# Patient Record
Sex: MALE | Race: WHITE | NOT HISPANIC OR LATINO | Employment: FULL TIME | URBAN - METROPOLITAN AREA
[De-identification: names, ages, dates, MRNs, and addresses within clinical notes are randomized per-mention and may not be internally consistent; named-entity substitution may affect disease eponyms.]

---

## 2022-11-25 ASSESSMENT — ENCOUNTER SYMPTOMS
HEARTBURN: 0
FREQUENCY: 0
FEVER: 0
JOINT SWELLING: 0
NERVOUS/ANXIOUS: 0
WEAKNESS: 0
DIARRHEA: 0
HEADACHES: 0
NAUSEA: 0
SORE THROAT: 0
CHILLS: 0
DIZZINESS: 0
CONSTIPATION: 0
PARESTHESIAS: 0
ARTHRALGIAS: 0
DYSURIA: 0
MYALGIAS: 0
EYE PAIN: 0
ABDOMINAL PAIN: 0
COUGH: 0
HEMATOCHEZIA: 0
HEMATURIA: 0
SHORTNESS OF BREATH: 0
PALPITATIONS: 0

## 2022-12-02 ENCOUNTER — LAB (OUTPATIENT)
Dept: LAB | Facility: CLINIC | Age: 37
End: 2022-12-02
Payer: COMMERCIAL

## 2022-12-02 ENCOUNTER — OFFICE VISIT (OUTPATIENT)
Dept: FAMILY MEDICINE | Facility: CLINIC | Age: 37
End: 2022-12-02
Payer: COMMERCIAL

## 2022-12-02 VITALS
OXYGEN SATURATION: 99 % | HEIGHT: 75 IN | RESPIRATION RATE: 11 BRPM | SYSTOLIC BLOOD PRESSURE: 137 MMHG | DIASTOLIC BLOOD PRESSURE: 89 MMHG | TEMPERATURE: 97.5 F | BODY MASS INDEX: 21.82 KG/M2 | WEIGHT: 175.5 LBS | HEART RATE: 76 BPM

## 2022-12-02 DIAGNOSIS — Z13.220 SCREENING FOR HYPERLIPIDEMIA: ICD-10-CM

## 2022-12-02 DIAGNOSIS — Z00.00 ENCOUNTER FOR ANNUAL PHYSICAL EXAM: Primary | ICD-10-CM

## 2022-12-02 DIAGNOSIS — Z13.1 SCREENING FOR DIABETES MELLITUS: ICD-10-CM

## 2022-12-02 DIAGNOSIS — Z23 ENCOUNTER FOR IMMUNIZATION: ICD-10-CM

## 2022-12-02 DIAGNOSIS — I78.1 NEVUS, NON-NEOPLASTIC: ICD-10-CM

## 2022-12-02 LAB
CHOLEST SERPL-MCNC: 204 MG/DL
FASTING STATUS PATIENT QL REPORTED: YES
GLUCOSE SERPL-MCNC: 84 MG/DL (ref 70–99)
HDLC SERPL-MCNC: 79 MG/DL
LDLC SERPL CALC-MCNC: 116 MG/DL
NONHDLC SERPL-MCNC: 125 MG/DL
TRIGL SERPL-MCNC: 45 MG/DL

## 2022-12-02 PROCEDURE — 80061 LIPID PANEL: CPT

## 2022-12-02 PROCEDURE — 90686 IIV4 VACC NO PRSV 0.5 ML IM: CPT

## 2022-12-02 PROCEDURE — 36415 COLL VENOUS BLD VENIPUNCTURE: CPT

## 2022-12-02 PROCEDURE — 0134A COVID-19 VACCINE BIVALENT BOOSTER 18+ (MODERNA): CPT

## 2022-12-02 PROCEDURE — 90471 IMMUNIZATION ADMIN: CPT

## 2022-12-02 PROCEDURE — 82947 ASSAY GLUCOSE BLOOD QUANT: CPT

## 2022-12-02 PROCEDURE — 91313 COVID-19 VACCINE BIVALENT BOOSTER 18+ (MODERNA): CPT

## 2022-12-02 PROCEDURE — 99385 PREV VISIT NEW AGE 18-39: CPT | Mod: 25

## 2022-12-02 ASSESSMENT — ENCOUNTER SYMPTOMS
CONSTIPATION: 0
HEADACHES: 0
HEMATOCHEZIA: 0
NAUSEA: 0
DIARRHEA: 0
DYSURIA: 0
DIZZINESS: 0
SORE THROAT: 0
WEAKNESS: 0
NERVOUS/ANXIOUS: 0
COUGH: 0
PARESTHESIAS: 0
CHILLS: 0
ARTHRALGIAS: 0
EYE PAIN: 0
ABDOMINAL PAIN: 0
MYALGIAS: 0
SHORTNESS OF BREATH: 0
HEARTBURN: 0
HEMATURIA: 0
FREQUENCY: 0
FEVER: 0
PALPITATIONS: 0
JOINT SWELLING: 0

## 2022-12-02 ASSESSMENT — PAIN SCALES - GENERAL: PAINLEVEL: NO PAIN (0)

## 2022-12-02 NOTE — PROGRESS NOTES
SUBJECTIVE:   CC: Sesar is an 37 year old who presents for preventative health visit.   Patient has been advised of split billing requirements and indicates understanding: Yes  Healthy Habits:     Getting at least 3 servings of Calcium per day:  Yes    Bi-annual eye exam:  NO    Dental care twice a year:  Yes    Sleep apnea or symptoms of sleep apnea:  None    Diet:  Regular (no restrictions)    Frequency of exercise:  2-3 days/week    Duration of exercise:  45-60 minutes    Taking medications regularly:  Yes    Medication side effects:  Not applicable    PHQ-2 Total Score: 0    Additional concerns today:  No    Exercise: Volleyball in [place]. Cardio-bike, weights.  Moved her from [place] to be near family sister. Mom and dad are moving up here from [place]  Family: Wife- Yulisa ([employer]- ). 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    Lack of discipline, Pet has been tough for getting. Purchased some equipment.    Today's PHQ-2 Score:   PHQ-2 ( 1999 Pfizer) 11/25/2022   Q1: Little interest or pleasure in doing things 0   Q2: Feeling down, depressed or hopeless 0   PHQ-2 Score 0   Q1: Little interest or pleasure in doing things Not at all   Q2: Feeling down, depressed or hopeless Not at all   PHQ-2 Score 0       Have you ever done Advance Care Planning? (For example, a Health Directive, POLST, or a discussion with a medical provider or your loved ones about your wishes): No, advance care planning information given to patient to review.  Patient plans to discuss their wishes with loved ones or provider.      Social History     Tobacco Use     Smoking status: Never     Smokeless tobacco: Never   Substance Use Topics     Alcohol use: Not on file     If you drink alcohol do you typically have >3 drinks per day or >7 drinks per week? No    Alcohol Use 11/25/2022   Prescreen: >3 drinks/day or >7  "drinks/week? No   No flowsheet data found.    Last PSA: No results found for: PSA    Reviewed orders with patient. Reviewed health maintenance and updated orders accordingly - Yes  Lab work is in process    Reviewed and updated as needed this visit by clinical staff   Tobacco   Meds              Reviewed and updated as needed this visit by Provider                   Review of Systems   Constitutional: Negative for chills and fever.   HENT: Negative for congestion, ear pain, hearing loss and sore throat.    Eyes: Negative for pain and visual disturbance.   Respiratory: Negative for cough and shortness of breath.    Cardiovascular: Negative for chest pain, palpitations and peripheral edema.   Gastrointestinal: Negative for abdominal pain, constipation, diarrhea, heartburn, hematochezia and nausea.   Genitourinary: Negative for dysuria, frequency, genital sores, hematuria, impotence, penile discharge and urgency.   Musculoskeletal: Negative for arthralgias, joint swelling and myalgias.   Skin: Negative for rash.   Neurological: Negative for dizziness, weakness, headaches and paresthesias.   Psychiatric/Behavioral: Negative for mood changes. The patient is not nervous/anxious.        OBJECTIVE:   /89 (BP Location: Right arm, Patient Position: Sitting, Cuff Size: Adult Regular)   Pulse 76   Temp 97.5  F (36.4  C) (Temporal)   Resp 11   Ht 1.91 m (6' 3.2\")   Wt 79.6 kg (175 lb 8 oz)   SpO2 99%   BMI 21.82 kg/m      Physical Exam  GENERAL: healthy, alert and no distress  EYES: Eyes grossly normal to inspection, PERRL and conjunctivae and sclerae normal  HENT: ear canals and TM's normal, nose and mouth without ulcers or lesions  NECK: no adenopathy, no asymmetry, masses, or scars and thyroid normal to palpation  RESP: lungs clear to auscultation - no rales, rhonchi or wheezes  CV: regular rate and rhythm, normal S1 S2, no S3 or S4, no murmur, click or rub, no peripheral edema and peripheral pulses " strong  ABDOMEN: soft, nontender, no hepatosplenomegaly, no masses and bowel sounds normal  MS: no gross musculoskeletal defects noted, no edema  SKIN: Multiple nevi across back, with a ~3-4 mm darkened macular nevus on upper back, uniform in color and regular oval shape.  NEURO: Normal strength and tone, mentation intact and speech normal  PSYCH: mentation appears normal, affect normal/bright      ASSESSMENT/PLAN:   (Z00.00) Encounter for annual physical exam  (primary encounter diagnosis)  Plan: Adult Eye  Referral    (Z13.220) Screening for hyperlipidemia  Plan: Lipid panel reflex to direct LDL Fasting    (I78.1) Nevus, non-neoplastic  Plan: Adult Dermatology Referral  Patient is interested in getting a skin check with dermatology for the moles on his back.  His moles currently appear benign at this time.    (Z23) Encounter for immunization  Plan: INFLUENZA VACCINE IM > 6 MONTHS VALENT IIV4         (AFLURIA/FLUZONE), COVID-19 VACCINE BIVALENT         BOOSTER 18+ (MODERNA)    (Z13.1) Screening for diabetes mellitus  Plan: Glucose    Patient has been advised of split billing requirements and indicates understanding: Yes      COUNSELING:   Reviewed preventive health counseling, as reflected in patient instructions       Regular exercise       Healthy diet/nutrition      He reports that he has never smoked. He has never used smokeless tobacco.    Ortega Jeffrey NP  [facility]

## 2023-01-05 ENCOUNTER — TELEPHONE (OUTPATIENT)
Dept: FAMILY MEDICINE | Facility: CLINIC | Age: 38
End: 2023-01-05

## 2023-01-10 NOTE — TELEPHONE ENCOUNTER
Patient called back and rescheduled for in-person on 1/17/23.     Trista Weinstein RN  [facility]

## 2023-01-10 NOTE — TELEPHONE ENCOUNTER
Attempted to call patient and also sent message via [employer].     Trista Weinstein RN  [facility]

## 2023-01-10 NOTE — TELEPHONE ENCOUNTER
Please call patient and see if he is able to come to the clinic for an in-person visit on 1/16 when he is scheduled.  It is a consult for a vasectomy and that should be in person.    Thanks,  Chapincito Real MD

## 2023-01-13 ENCOUNTER — OFFICE VISIT (OUTPATIENT)
Dept: OPHTHALMOLOGY | Facility: CLINIC | Age: 38
End: 2023-01-13
Attending: STUDENT IN AN ORGANIZED HEALTH CARE EDUCATION/TRAINING PROGRAM
Payer: COMMERCIAL

## 2023-01-13 DIAGNOSIS — H02.883 MEIBOMIAN GLAND DYSFUNCTION (MGD) OF BOTH EYES: ICD-10-CM

## 2023-01-13 DIAGNOSIS — H02.886 MEIBOMIAN GLAND DYSFUNCTION (MGD) OF BOTH EYES: ICD-10-CM

## 2023-01-13 PROCEDURE — G0463 HOSPITAL OUTPT CLINIC VISIT: HCPCS | Mod: 25

## 2023-01-13 PROCEDURE — 92004 COMPRE OPH EXAM NEW PT 1/>: CPT | Performed by: STUDENT IN AN ORGANIZED HEALTH CARE EDUCATION/TRAINING PROGRAM

## 2023-01-13 ASSESSMENT — VISUAL ACUITY
METHOD: SNELLEN - LINEAR
METHOD_MR: PATIENT DECLINED
OS_SC: 20/20
OD_SC: 20/20

## 2023-01-13 ASSESSMENT — CONF VISUAL FIELD
METHOD: COUNTING FINGERS
OS_NORMAL: 1
OS_SUPERIOR_NASAL_RESTRICTION: 0
OD_NORMAL: 1
OD_SUPERIOR_NASAL_RESTRICTION: 0
OS_SUPERIOR_TEMPORAL_RESTRICTION: 0
OS_INFERIOR_NASAL_RESTRICTION: 0
OD_INFERIOR_TEMPORAL_RESTRICTION: 0
OD_SUPERIOR_TEMPORAL_RESTRICTION: 0
OS_INFERIOR_TEMPORAL_RESTRICTION: 0
OD_INFERIOR_NASAL_RESTRICTION: 0

## 2023-01-13 ASSESSMENT — TONOMETRY
IOP_METHOD: TONOPEN
OS_IOP_MMHG: 19
OD_IOP_MMHG: 20

## 2023-01-13 ASSESSMENT — CUP TO DISC RATIO
OD_RATIO: 0.25
OS_RATIO: 0.25

## 2023-01-13 ASSESSMENT — EXTERNAL EXAM - RIGHT EYE: OD_EXAM: WNL

## 2023-01-13 ASSESSMENT — EXTERNAL EXAM - LEFT EYE: OS_EXAM: WNL

## 2023-01-13 ASSESSMENT — SLIT LAMP EXAM - LIDS
COMMENTS: MGD, COLLARETTES
COMMENTS: MGD, COLLARETTES

## 2023-01-13 NOTE — PROGRESS NOTES
HPI    Patient states that his vision is well in both eyes at distance and near. NO pain and irritation. No flashes of light. No floaters. First eye exam. Was recommended by primary doctor.     Ocular Meds:none     Rex Leyva COT, January 13, 2023 8:53 AM        Last edited by Rex Leyva on 1/13/2023  9:05 AM.          Review of systems for the eyes was negative other than the pertinent positives/negatives listed in the HPI.    Ocular Meds: none    Ocular Hx: none    FOHx: no family history of glaucoma or blindness    PMHx: No diabetes    Assessment & Plan      Adriano Lou is a 37 year old male with the following diagnoses:    1. Meibomian gland dysfunction (MGD) of both eyes       Here for routine eye exam. No new visual or ocular issues.    Meibomian Gland Dysfunction OU  - preservative free artificial tears QID and PRN OU  - warm compresses at least BID OU for 5-10 min each time  - eyelid scrubs BID OU with baby shampoo or using lid scrub products such as OCuSOFT    Counseled return/RD precautions    Patient disposition:   Return in about 1 year (around 1/13/2024) for Annual Visit, or sooner changes.       Attending Physician Attestation:  Complete documentation of historical and exam elements from today's encounter can be found in the full encounter summary report (not reduplicated in this progress note).  I personally obtained the chief complaint(s) and history of present illness.  I confirmed and edited as necessary the review of systems, past medical/surgical history, family history, social history, and examination findings as documented by others; and I examined the patient myself.  I personally reviewed the relevant tests, images, and reports as documented above.  I formulated and edited as necessary the assessment and plan and discussed the findings and management plan with the patient and family. . - Lisette Cool MD

## 2023-01-13 NOTE — NURSING NOTE
Chief Complaints and History of Present Illnesses   Patient presents with     COMPREHENSIVE EYE EXAM     Chief Complaint(s) and History of Present Illness(es)     COMPREHENSIVE EYE EXAM           Comments    Patient states that his vision is well in both eyes at distance and near. NO pain and irritation. No flashes of light. No floaters. First eye exam. Was recommended by primary doctor.     Ocular Meds:none     Rex Leyva COT, January 13, 2023 8:53 AM

## 2023-01-17 ENCOUNTER — OFFICE VISIT (OUTPATIENT)
Dept: FAMILY MEDICINE | Facility: CLINIC | Age: 38
End: 2023-01-17
Payer: COMMERCIAL

## 2023-01-17 VITALS
RESPIRATION RATE: 10 BRPM | SYSTOLIC BLOOD PRESSURE: 133 MMHG | OXYGEN SATURATION: 100 % | BODY MASS INDEX: 21.94 KG/M2 | DIASTOLIC BLOOD PRESSURE: 88 MMHG | WEIGHT: 171 LBS | TEMPERATURE: 97.8 F | HEART RATE: 60 BPM | HEIGHT: 74 IN

## 2023-01-17 DIAGNOSIS — Z30.09 ENCOUNTER FOR VASECTOMY COUNSELING: Primary | ICD-10-CM

## 2023-01-17 PROCEDURE — 99213 OFFICE O/P EST LOW 20 MIN: CPT | Performed by: FAMILY MEDICINE

## 2023-01-17 RX ORDER — DIAZEPAM 10 MG
10 TABLET ORAL ONCE
Qty: 1 TABLET | Refills: 0 | Status: SHIPPED | OUTPATIENT
Start: 2023-01-17 | End: 2023-01-17

## 2023-01-17 ASSESSMENT — PAIN SCALES - GENERAL: PAINLEVEL: NO PAIN (0)

## 2023-01-17 NOTE — PROGRESS NOTES
"  Assessment & Plan     Encounter for vasectomy counseling  Vasectomy information was reviewed with the patient.  Optional prescription was issued for Valium.  He can reach out to the clinic and the  can help him schedule the procedure when he would like to proceed.  - diazepam (VALIUM) 10 MG tablet; Take 1 tablet (10 mg) by mouth once for 1 dose 30-60 minutes before the procedure, must have                  No follow-ups on file.    Chapincito Real MD  [facility] TACO Kaba is a 37 year old presenting for the following health issues:  Vasectomy Consult      History of Present Illness       Reason for visit:  Vasectomy Consult    He eats 2-3 servings of fruits and vegetables daily.He consumes 0 sweetened beverage(s) daily.He exercises with enough effort to increase his heart rate 30 to 60 minutes per day.  He exercises with enough effort to increase his heart rate 5 days per week.   He is taking medications regularly.         Patient is here to consult regarding a vasectomy.  He and his wife do not have any children and are confident that they do not want any children.  He has no history of genitourinary surgery, does not take any chronic medications for any chronic health issues, and there is no family history of prostate cancer.    Review of Systems   Constitutional, HEENT, cardiovascular, pulmonary, gi and gu systems are negative, except as otherwise noted.      Objective    /88 (BP Location: Left arm, Patient Position: Sitting, Cuff Size: Adult Regular)   Pulse 60   Temp 97.8  F (36.6  C) (Temporal)   Resp 10   Ht 1.888 m (6' 2.33\")   Wt 77.6 kg (171 lb)   SpO2 100%   BMI 21.76 kg/m    Body mass index is 21.76 kg/m .  Physical Exam   GENERAL: healthy, alert and no distress  EYES: Eyes grossly normal to inspection, PERRL and conjunctivae and sclerae normal  NECK: no adenopathy, no asymmetry, masses, or scars and thyroid normal to " palpation  RESP: lungs clear to auscultation - no rales, rhonchi or wheezes  CV: regular rate and rhythm, normal S1 S2, no S3 or S4, no murmur, click or rub, no peripheral edema and peripheral pulses strong   (male): normal male genitalia without lesions or urethral discharge, no hernia   (male): testicles normal without atrophy or masses, no hernias, penis normal without urethral discharge and both vas deferens were isolated and brought to the midline with minimal difficulty.  MS: no gross musculoskeletal defects noted, no edema  SKIN: no suspicious lesions or rashes  NEURO: Normal strength and tone, mentation intact and speech normal  PSYCH: mentation appears normal, affect normal/bright

## 2023-02-10 ENCOUNTER — OFFICE VISIT (OUTPATIENT)
Dept: FAMILY MEDICINE | Facility: CLINIC | Age: 38
End: 2023-02-10
Payer: COMMERCIAL

## 2023-02-10 ENCOUNTER — APPOINTMENT (OUTPATIENT)
Dept: FAMILY MEDICINE | Facility: CLINIC | Age: 38
End: 2023-02-10
Payer: COMMERCIAL

## 2023-02-10 VITALS
WEIGHT: 172.5 LBS | HEIGHT: 74 IN | BODY MASS INDEX: 22.14 KG/M2 | SYSTOLIC BLOOD PRESSURE: 138 MMHG | DIASTOLIC BLOOD PRESSURE: 85 MMHG | TEMPERATURE: 97.8 F | RESPIRATION RATE: 10 BRPM | HEART RATE: 63 BPM | OXYGEN SATURATION: 100 %

## 2023-02-10 DIAGNOSIS — Z30.2 ENCOUNTER FOR VASECTOMY: Primary | ICD-10-CM

## 2023-02-10 PROCEDURE — 55250 REMOVAL OF SPERM DUCT(S): CPT | Performed by: FAMILY MEDICINE

## 2023-02-10 PROCEDURE — 88302 TISSUE EXAM BY PATHOLOGIST: CPT | Performed by: PATHOLOGY

## 2023-02-10 ASSESSMENT — PAIN SCALES - GENERAL: PAINLEVEL: NO PAIN (0)

## 2023-02-10 NOTE — PROGRESS NOTES
"  Assessment & Plan     Encounter for vasectomy  After cares are reviewed with the patient.  Information was given regarding aftercare's.  He was also given a sample container for specimen collection.  - VASECTOMY UNILAT/BILAT W POSTOP SEMEN  - Semen Analysis Post Vasectomy; Future  - Surgical Pathology Exam                 Return in about 1 week (around 2/17/2023) for suture removal at home.    Chapincito Real MD  [facility] TACO Kaba is a 37 year old, presenting for the following health issues:  Vasectomy      HPI       Patient presents today for elective sterilization by vasectomy.  He is accompanied by his wife today.  He has no questions and wishes to proceed with the procedure.    Review of Systems         Objective    /85 (BP Location: Right arm, Patient Position: Sitting, Cuff Size: Adult Regular)   Pulse 63   Temp 97.8  F (36.6  C) (Temporal)   Resp 10   Ht 1.885 m (6' 2.21\")   Wt 78.2 kg (172 lb 8 oz)   SpO2 100%   BMI 22.02 kg/m    Body mass index is 22.02 kg/m .  Physical Exam           Procedure note:    After informed consent was obtained we went to the procedure room.  Patient was placed in the supine position on the operating table.  The penis was retracted away from the scrotum and secured using paper tape.  The scrotum, inner thighs, and perineum were then cleaned with a Hibiclens based surgical scrub.  Sterile drape was placed over the scrotum to complete the operating field.  Attention was turned first to the left vasa which was isolated with minimal difficulty and brought to the midline.  1% lidocaine was used for anesthesia.  The vas was then grasped with the vasectomy clamp and secured adjacent to the skin.  No scalpel technique was used to enter the skin after making a 2 mm stab wound with a scalpel.  Blunt dissection and cautery were used to isolate the vas deferens.  Once the vas deferens was isolated a 1 cm section was removed and " intraluminal cautery was used to close both ends remaining in the patient.  The elongated cautery tip broke while cauterizing the prostatic end.  The cautery tip fragment was removed easily and inspected and noted to be complete.  There was a dilated pampiniform vein traversing the 2 ends of the vas.  A suture was placed to secure the site and it was allowed to drop back into the scrotum.  Attention was then turned to the right vas which was isolated and brought to the previously made opening.  1% lidocaine was instilled for anesthesia and the vas deferens was grasped using the vasectomy clamp through the previously made opening.  Blunt dissection and cautery were used to isolate the vas deferens.  1 cm section was removed and both ends remaining in the patient were closed with intraluminal cautery.  The site was closely inspected and no bleeding was noted so it was allowed to drop back into the scrotum.  The left site was then brought back through the opening in the skin, inspected, and no bleeding was noted so the site was allowed to drop back into the scrotum.  The skin was then closed with one 4-0 Vicryl interrupted suture.  EBL from the procedure was less than 2 cc.  No complications were noted.  Specimen to pathology left and right vas deferens.

## 2023-02-14 LAB
PATH REPORT.COMMENTS IMP SPEC: NORMAL
PATH REPORT.COMMENTS IMP SPEC: NORMAL
PATH REPORT.FINAL DX SPEC: NORMAL
PATH REPORT.GROSS SPEC: NORMAL
PATH REPORT.MICROSCOPIC SPEC OTHER STN: NORMAL
PATH REPORT.RELEVANT HX SPEC: NORMAL
PHOTO IMAGE: NORMAL

## 2023-04-11 NOTE — PROGRESS NOTES
[facility] Dermatology Note  Encounter Date: Apr 12, 2023  Office Visit     Dermatology Problem List:  # Multiple benign nevi  - 4mm left upper mid back, photos 4/12/2023  - 5mm right posterior thigh, photos 4/12/2023   # Intermittent tanning bed use for ~1 year, <100 times.    ____________________________________________    Assessment & Plan:    # Multiple benign nevi  # Solar lentigines   - Monitor nevi on L upper mid back, R posterior thigh, photos today  - Counseled on ABCDEs of melanoma and sun protection - recommend SPF 30 or higher with frequent application   - Return sooner if noticing changing or symptomatic lesions      # Cherry angiomas  # Seborrheic keratoses  - Discussed the natural history and benign nature of this lesion.   - Reassurance provided that no additional treatment is necessary.      Procedures Performed:   None    Follow-up: 1 year(s) in-person, or earlier for new or changing lesions    Staff and Scribe:     Scribe Disclosure:  I, MORGAN BARBOUR, am serving as a scribe to document services personally performed by Emily Eddy MD based on data collection and the provider's statements to me.     Larissa Muniz MD  Dermatology Resident, PGY-2    Staff Physician Comments:   I saw and evaluated the patient with the resident and I agree with the assessment and plan.  I was present for the examination.    Emily Eddy MD    Department of Dermatology  [facility]: Phone: [phone], Fax: [phone]  4/12/2023     ____________________________________________    CC: Derm Problem (Patient reports moles of concern. Patient would like a full body skin.)    HPI:  Mr. Adriano Lou is a(n) 37 year old male who presents today as a new patient for FBSE. The patient notes that he has had a few sun burns as a kid but cannot remember any blistering sun burns. The patient notes  that he has a spot on his back that his wife would like to have evaluated but otherwise has no lesions of concerns. No symptomatic lesions.     [place] in [place], tanning bed use intermittently for ~1 year.    Patient is otherwise feeling well, without additional skin concerns.    Labs Reviewed:  N/A    Physical Exam:  Vitals: There were no vitals taken for this visit.  SKIN: Total skin excluding the undergarment areas was performed. The exam included the head/face, neck, both arms, chest, back, abdomen, both legs, digits and/or nails.   -  4mm left upper mid back hyperpigmented macule with symmetric reticular pattern   - 5mm posterior right thigh hyperpigmented macule with unform reticular network but left upper pole with slightly darker pigmented noted on dermoscopy   - Multiple regular brown pigmented macules and papules are identified on the trunk and extremities.   - Scattered brown macules on sun exposed areas.  - There are waxy stuck on tan to brown papules on the trunk and extremities.   - No other lesions of concern on areas examined.                 Medications:  No current outpatient medications on file.     No current facility-administered medications for this visit.      Past Medical History:   There is no problem list on file for this patient.    History reviewed. No pertinent past medical history.     CC Ortega Jeffrey, NP  [address] [place] [address] on close of this encounter.

## 2023-04-12 ENCOUNTER — OFFICE VISIT (OUTPATIENT)
Dept: DERMATOLOGY | Facility: CLINIC | Age: 38
End: 2023-04-12
Payer: COMMERCIAL

## 2023-04-12 DIAGNOSIS — D18.01 CHERRY ANGIOMA: ICD-10-CM

## 2023-04-12 DIAGNOSIS — D22.9 MULTIPLE BENIGN NEVI: ICD-10-CM

## 2023-04-12 DIAGNOSIS — L82.1 SEBORRHEIC KERATOSIS: ICD-10-CM

## 2023-04-12 DIAGNOSIS — L81.4 LENTIGINES: Primary | ICD-10-CM
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
## 2023-04-12 PROCEDURE — 99203 OFFICE O/P NEW LOW 30 MIN: CPT | Mod: GC | Performed by: DERMATOLOGY

## 2023-04-12 ASSESSMENT — PAIN SCALES - GENERAL: PAINLEVEL: NO PAIN (0)

## 2023-04-12 NOTE — PATIENT INSTRUCTIONS
"Sun Protection    Recommend sunscreen (more brands also below):  - ISTALISHA Eryfotona Actinica Mineral Sunscreen SPF 50+ Zinc Oxide  - ISTALISHA Minear Brush SPF 50 (for re-application throughout the day)       Sunscreen   What does \"broad spectrum mean\"?  Broad spectrum sunscreens protect against both UVA and UVB radiation. UVC is filtered out by the ozone layer.     What does SPF mean?   SPF stands for  Sun Protection Factor  and represents the ability to screen only UVB (burning) rays. UVB rays are mostly blocked in all sunscreens, but only those that contain titanium dioxide, zinc oxide, mexoryl or Parsol 1789 (avobenzone) block the UVA spectrum. Even though a sunscreen is labeled  UVA/UVB Protection  that is not entirely accurate because products that only partially protect against UVA can claim to protect against both UVA and UVB.     What SPF should I chose?   Aim to get a sunscreen that is at least sun protection factor (SPF) 30. SPF 15 provides about 92-93% coverage, SPF 30 about 95-97% coverage, and SPF 45 about 98% coverage. That is to say, SPF 30 is not twice as good as SPF 15. The reason why we recommend SPF 30 is because we are usually only putting on half the necessary amount of sunscreen to achieve the advertised protection. That means that it is very possible that your SPF 30 sunscreen is only providing you with SPF 15 coverage based on how much you are applying. SPF 15 (92-93% coverage) is the absolute minimal that we recommend. Similarly, the benefit of sunscreens with SPF higher than 50 is that even if you put on less than the required amount, you are likely still getting good protection (ex: even if you apply only half the recommended amount of , it should still provide you with an SPF of 50).     How much should I apply?  If covering your whole body, you should be using 30 grams, or one ounce, which is how much is in one shot glass! That s a THICK layer! May times, you are only applying half " the recommended amount, which means that you are only getting half the SPF (for example, you may be using SPF 30 but if you're only applying half the recommended amount, you're only getting SPF 15 protection.      When do I need to wear sunscreen?  Every day, rain or shine! Even on a rainy day or a day when you are only indoors, you are still being exposed harmful UV radiation from the sun. We usually recommend physical/mineral sunscreens (active ingredient is titanium dioxide or zinc oxide) as these ingredients have been around for many years, there is no concern of them being absorbed into the bloodstream, and they are coral reef friendly! However, the best sunscreen is the one that you will use everyday.     What about my kids?  Sunscreen is not recommended for infants under the age of 6 months. Use clothing, shade and sun avoidance for small infants. For kids older than 6 months, we recommend that you should use only mineral/physical sunscreens that have zinc oxide as the active ingredient. Sun-protective clothing and hats are also important for people of all ages.     Sun protective clothing and Resources   Coolibar (www.coolibar.ConvertMedia)  Interactive Bid Games Inc (grabHalo)  Athleta (wwwishBowl)  SimpleDeal (wwwSportsCrunch)  Carve Designs (Asset Tracking Technologies) - affordable  Skinz (Open Mileskinz.com)    Long sleeve - Arlen Cool DRI UPF 50 or [place] PFG UPF 50  Hoodie - [place] PFG UPF 50  Swimshirt/Rash Guard - Sindy UPF 50 (on Amazon)  Neck - Outdoor Research Ubertubes (www.outdoorresearch.com)      Do I need tinted sunscreen?  There is more and more research showing that visible light can also lead to discoloration (such as melasma). Tinted sunscreens (which contain iron oxides) protect against visible light as an added bonus.     What brands do you recommend?  Physical/Mineral Sunscreens (in no particular order)  Elta MD UV Physical Broad-Spectrum SPF 41 Sunscreen (Tinted, $33)  Skin Ceuticals Physical Fusion UV  "Defense SPF 50 (Tinted, $34)  Unsun Mineral Tinted Face Sunscreen (Tinted, with 2 shade ranges, $29)  It Cosmetics CC+ Cream with SPF 50+ (Tinted, can also double as foundation/coverage -- great range of shades, $40)  Biossance Squalane + Zinc Sheer Mineral Sunscreen SPF 30 PA +++ (goes on white then blends in, $30)  Cerave 100% Mineral Sunscreen SPF 50 Face (good for sensitive skin, $15)  La Roche Posay Anthelios Mineral Zinc Oxide Sunscreen SPF 50 ($35)  La Roche Posay Anthelios Mineral Tinted Sunscreen for Face SPF 50 ($35)  Think Sport Sunscreen (great for sports, though has more of a white cast, $20)  Think Baby Sunscreen (for kids, $21)  Color Science Sunforgettable Total Protection Brush On Shield SPF 50 (Multiple tints, $130)    Chemical Sunscreens  Becca Rouleau Weightless Protection SPF 30 ($48)  Zeniada SPF Brightening Moisturizer ($30)  Urban Skin Complexion Protection Moisturizer SPF 30 ($20)  Total Defense + Repair Broad Spectrum SPF 34 ($68)  Clarins UV PLUS Anti-Pollution Sunscreen Multi-Protection Tint SPF 50 (Multiple tints, $45)  Neutrogena Healthy Skin Glow Sheers Tinted Moisturizer with SPF 20 (Multiple tints, $11)    The ABCDEs of Melanoma  Skin cancer can develop anywhere on the skin. Once a month, take a look at your entire body and note any changing moles or spots. Ask someone for help when checking your skin, especially for hard to see places such as your back. If you notice a mole that looks different from others, or one that changes, enlarges, itches, or bleeds, you should see a dermatologist.    Asymmetry, Border (irregularity), Color (not uniform, changes in color), Diameter (greater than 6 mm which is about the size of a pencil eraser), and Evolving (any changes in pre-existing moles). In short, look for the \"ugly duckling.\" You want all of the spots on your body to look like cousins (like they could be related). If something stands out, take a photo of it and make an appointment to " have it evaluated.     Suggested supplement:   - Heliocare - claims to maintain the skin's ability to protect itself against sun-related effects and aging.   - Not a replacement for sunscreen! Should be used in addition to sunscreen and sun protective measures such as hats, etc.  - Usually available online and at major retailers such as Plan Me Up, etc.

## 2023-04-12 NOTE — NURSING NOTE
Dermatology Rooming Note    Adriano Lou's goals for this visit include:   Chief Complaint   Patient presents with     Derm Problem     Patient reports moles of concern. Patient would like a full body skin.     Trista Wong RN

## 2023-04-12 NOTE — LETTER
4/12/2023       RE: Adriano Lou  [address]  [facility] [address]     Dear Colleague,    Thank you for referring your patient, Adriano Lou, to the [facility] [place] at [facility]. Please see a copy of my visit note below.    [facility] Dermatology Note  Encounter Date: Apr 12, 2023  Office Visit     Dermatology Problem List:  # Multiple benign nevi  - 4mm left upper mid back, photos 4/12/2023  - 5mm right posterior thigh, photos 4/12/2023   # Intermittent tanning bed use for ~1 year, <100 times.    ____________________________________________    Assessment & Plan:    # Multiple benign nevi  # Solar lentigines   - Monitor nevi on L upper mid back, R posterior thigh, photos today  - Counseled on ABCDEs of melanoma and sun protection - recommend SPF 30 or higher with frequent application   - Return sooner if noticing changing or symptomatic lesions      # Cherry angiomas  # Seborrheic keratoses  - Discussed the natural history and benign nature of this lesion.   - Reassurance provided that no additional treatment is necessary.      Procedures Performed:   None    Follow-up: 1 year(s) in-person, or earlier for new or changing lesions    Staff and Scribe:     Scribe Disclosure:  I, MORGAN BARBOUR, am serving as a scribe to document services personally performed by Emily Eddy MD based on data collection and the provider's statements to me.     Larissa Muniz MD  Dermatology Resident, PGY-2    Staff Physician Comments:   I saw and evaluated the patient with the resident and I agree with the assessment and plan.  I was present for the examination.    Emily Eddy MD    Department of Dermatology  [facility]: Phone: [phone], Fax: [phone]  4/12/2023      ____________________________________________    CC: Derm Problem (Patient reports moles of concern. Patient would like a full body skin.)    HPI:  Mr. Adriano Lou is a(n) 37 year old male who presents today as a new patient for FBSE. The patient notes that he has had a few sun burns as a kid but cannot remember any blistering sun burns. The patient notes that he has a spot on his back that his wife would like to have evaluated but otherwise has no lesions of concerns. No symptomatic lesions.     Grew up in [place], tanning bed use intermittently for ~1 year.    Patient is otherwise feeling well, without additional skin concerns.    Labs Reviewed:  N/A    Physical Exam:  Vitals: There were no vitals taken for this visit.  SKIN: Total skin excluding the undergarment areas was performed. The exam included the head/face, neck, both arms, chest, back, abdomen, both legs, digits and/or nails.   -  4mm left upper mid back hyperpigmented macule with symmetric reticular pattern   - 5mm posterior right thigh hyperpigmented macule with unform reticular network but left upper pole with slightly darker pigmented noted on dermoscopy   - Multiple regular brown pigmented macules and papules are identified on the trunk and extremities.   - Scattered brown macules on sun exposed areas.  - There are waxy stuck on tan to brown papules on the trunk and extremities.   - No other lesions of concern on areas examined.                 Medications:  No current outpatient medications on file.     No current facility-administered medications for this visit.      Past Medical History:   There is no problem list on file for this patient.    History reviewed. No pertinent past medical history.     CC Ortega Jeffrey, NP  [address] [place] [address] on close of this encounter.

## 2023-05-22 ENCOUNTER — OFFICE VISIT (OUTPATIENT)
Dept: OPHTHALMOLOGY | Facility: CLINIC | Age: 38
End: 2023-05-22
Attending: STUDENT IN AN ORGANIZED HEALTH CARE EDUCATION/TRAINING PROGRAM
Payer: COMMERCIAL

## 2023-05-22 DIAGNOSIS — H00.15 CHALAZION LEFT LOWER EYELID: ICD-10-CM

## 2023-05-22 DIAGNOSIS — H01.009 BLEPHARITIS OF BOTH UPPER AND LOWER EYELID: Primary | ICD-10-CM

## 2023-05-22 PROCEDURE — 99213 OFFICE O/P EST LOW 20 MIN: CPT | Performed by: STUDENT IN AN ORGANIZED HEALTH CARE EDUCATION/TRAINING PROGRAM

## 2023-05-22 RX ORDER — NEOMYCIN SULFATE, POLYMYXIN B SULFATE, AND DEXAMETHASONE 3.5; 10000; 1 MG/G; [USP'U]/G; MG/G
0.5 OINTMENT OPHTHALMIC AT BEDTIME
Qty: 3.5 G | Refills: 1 | Status: SHIPPED | OUTPATIENT
Start: 2023-05-22 | End: 2023-06-21

## 2023-05-22 RX ORDER — NEOMYCIN SULFATE, POLYMYXIN B SULFATE, AND DEXAMETHASONE 3.5; 10000; 1 MG/G; [USP'U]/G; MG/G
0.5 OINTMENT OPHTHALMIC 4 TIMES DAILY
Qty: 3.5 G | Refills: 1 | Status: SHIPPED | OUTPATIENT
Start: 2023-05-22 | End: 2023-05-22

## 2023-05-22 RX ORDER — NEOMYCIN SULFATE, POLYMYXIN B SULFATE, AND DEXAMETHASONE 3.5; 10000; 1 MG/G; [USP'U]/G; MG/G
0.5 OINTMENT OPHTHALMIC DAILY
Qty: 3.5 G | Refills: 1 | Status: SHIPPED | OUTPATIENT
Start: 2023-05-22 | End: 2023-05-22

## 2023-05-22 RX ORDER — NEOMYCIN SULFATE, POLYMYXIN B SULFATE, AND DEXAMETHASONE 3.5; 10000; 1 MG/G; [USP'U]/G; MG/G
0.5 OINTMENT OPHTHALMIC AT BEDTIME
Qty: 3.5 G | Refills: 1 | Status: SHIPPED | OUTPATIENT
Start: 2023-05-22 | End: 2023-05-22

## 2023-05-22 ASSESSMENT — VISUAL ACUITY
OD_SC: 20/15
METHOD: SNELLEN - LINEAR
OS_SC: 20/15

## 2023-05-22 ASSESSMENT — TONOMETRY
OD_IOP_MMHG: 16
IOP_METHOD: TONOPEN
OS_IOP_MMHG: 15

## 2023-05-22 ASSESSMENT — CONF VISUAL FIELD
OS_SUPERIOR_NASAL_RESTRICTION: 0
OD_INFERIOR_TEMPORAL_RESTRICTION: 0
OD_INFERIOR_NASAL_RESTRICTION: 0
OS_SUPERIOR_TEMPORAL_RESTRICTION: 0
OD_SUPERIOR_NASAL_RESTRICTION: 0
OS_INFERIOR_NASAL_RESTRICTION: 0
OS_NORMAL: 1
METHOD: COUNTING FINGERS
OS_INFERIOR_TEMPORAL_RESTRICTION: 0
OD_SUPERIOR_TEMPORAL_RESTRICTION: 0
OD_NORMAL: 1

## 2023-05-22 ASSESSMENT — EXTERNAL EXAM - RIGHT EYE: OD_EXAM: WNL

## 2023-05-22 ASSESSMENT — EXTERNAL EXAM - LEFT EYE: OS_EXAM: WNL

## 2023-05-22 NOTE — PROGRESS NOTES
HPI     Follow Up    In both eyes.  This started months ago.  Treatments tried include artificial tears.           Comments    Patient presents for Meibomian gland dysfunction (MGD) of both eyes.     Patient notes no changes in vision , does note an occurring stye on Lower left lid which first occurred 1 week ago, this is the 4th time this year so far that has occurred per pt. Occasional discomfort, denies pain. Is doing ocular treatments to help relieve.     Preservative AT's OU  Warm Compresses BID each eye  Eye lid scrubs BID each eye  Ocusoft     JOSELITO Waters OA May 22, 2023   Reviewed by Brittani Ramirez COT 10:28 AM May 22, 2023             Last edited by Brittani Ramirez on 5/22/2023 10:28 AM.          Review of systems for the eyes was negative other than the pertinent positives/negatives listed in the HPI.    Ocular Meds:   ATs prn OU  Warm Compresses BID OU  OcuSOFT Eye lid scrubs BID OU    Ocular Hx: mgd OU    FOHx: no family history of glaucoma or blindness    PMHx: No diabetes    Assessment & Plan      Brodyjones Lou is a 37 year old male with the following diagnoses:    1. Blepharitis of both upper and lower eyelid       - Here for follow up of mgd and new left lower eyelid chalazion; has had several stye come and go; discussed various treatment options including trial of PO doxycycline, antibiotic ophthalmic ointment, antibiotic-steroid combo ointment  - preservative free artificial tears QID and PRN OU  - warm compresses at least BID OU for 5-10 min each time  - eyelid scrubs BID OU with baby shampoo or using lid scrub products such as OCuSOFT  - start maxitrol ophthalmic ointment at bedtime OU x 30 days, can transition to erythromycin ophthalmic ointment at bedtime OU thereafter  - medication side effects reviewed    Counseled return/RD precautions    Patient disposition:   Return in about 4 weeks (around 6/19/2023) for Follow Up, VT, or sooner changes.       Attending Physician Attestation:   Complete documentation of historical and exam elements from today's encounter can be found in the full encounter summary report (not reduplicated in this progress note).  I personally obtained the chief complaint(s) and history of present illness.  I confirmed and edited as necessary the review of systems, past medical/surgical history, family history, social history, and examination findings as documented by others; and I examined the patient myself.  I personally reviewed the relevant tests, images, and reports as documented above.  I formulated and edited as necessary the assessment and plan and discussed the findings and management plan with the patient and family. . - Lisette Cool MD

## 2023-05-22 NOTE — NURSING NOTE
Chief Complaints and History of Present Illnesses   Patient presents with     Follow Up     Chief Complaint(s) and History of Present Illness(es)     Follow Up            Laterality: both eyes    Onset: months ago    Treatments tried: artificial tears          Comments    Patient presents for Meibomian gland dysfunction (MGD) of both eyes.     Patient notes no changes in vision , does note an occurring stye on Lower left lid which first occurred 1 week ago, this is the 4th time this year so far that has occurred per pt. Occasional discomfort, denies pain. Is doing ocular treatments to help relieve.     Preservative AT's OU  Warm Compresses BID each eye  Eye lid scrubs BID each eye  Regina Waters OA May 22, 2023   Reviewed by Brittani BOWER 10:28 AM May 22, 2023

## 2023-06-23 ENCOUNTER — OFFICE VISIT (OUTPATIENT)
Dept: OPHTHALMOLOGY | Facility: CLINIC | Age: 38
End: 2023-06-23
Attending: STUDENT IN AN ORGANIZED HEALTH CARE EDUCATION/TRAINING PROGRAM
Payer: COMMERCIAL

## 2023-06-23 DIAGNOSIS — H01.009 BLEPHARITIS OF BOTH UPPER AND LOWER EYELID: Primary | ICD-10-CM

## 2023-06-23 PROCEDURE — 99214 OFFICE O/P EST MOD 30 MIN: CPT | Performed by: STUDENT IN AN ORGANIZED HEALTH CARE EDUCATION/TRAINING PROGRAM

## 2023-06-23 PROCEDURE — 99213 OFFICE O/P EST LOW 20 MIN: CPT | Performed by: STUDENT IN AN ORGANIZED HEALTH CARE EDUCATION/TRAINING PROGRAM

## 2023-06-23 RX ORDER — ERYTHROMYCIN 5 MG/G
0.5 OINTMENT OPHTHALMIC AT BEDTIME
Qty: 3.5 G | Refills: 1 | Status: SHIPPED | OUTPATIENT
Start: 2023-06-23 | End: 2023-07-23

## 2023-06-23 ASSESSMENT — CONF VISUAL FIELD
OS_INFERIOR_TEMPORAL_RESTRICTION: 0
OD_INFERIOR_TEMPORAL_RESTRICTION: 0
OS_SUPERIOR_NASAL_RESTRICTION: 0
OD_INFERIOR_NASAL_RESTRICTION: 0
OS_INFERIOR_NASAL_RESTRICTION: 0
OD_SUPERIOR_NASAL_RESTRICTION: 0
METHOD: COUNTING FINGERS
OS_NORMAL: 1
OD_SUPERIOR_TEMPORAL_RESTRICTION: 0
OD_NORMAL: 1
OS_SUPERIOR_TEMPORAL_RESTRICTION: 0

## 2023-06-23 ASSESSMENT — VISUAL ACUITY
OD_SC: 20/15
OS_SC: 20/15
METHOD: SNELLEN - LINEAR

## 2023-06-23 ASSESSMENT — TONOMETRY
OS_IOP_MMHG: 14
IOP_METHOD: ICARE
OD_IOP_MMHG: 15

## 2023-06-23 ASSESSMENT — EXTERNAL EXAM - RIGHT EYE: OD_EXAM: WNL

## 2023-06-23 ASSESSMENT — EXTERNAL EXAM - LEFT EYE: OS_EXAM: WNL

## 2023-06-23 NOTE — PROGRESS NOTES
"HPI     Follow Up    In both eyes.  This started weeks ago.  Associated symptoms include redness (along LLL, some inflammation still).  Negative for eye pain and swelling.  Treatments tried include artificial tears and warm compresses.  Pain was noted as 0/10. Additional comments: 4 week follow up for Blepharitis BUL/LL, MGD each eye, and Chalazion LLL.     \"My eyes are feeling fine. The stye went away, but now there's some redness and inflammation.\" Patient states vision is good each eye. Denies changes in the last month.            Comments    Ocular meds:   - PFAT QID each eye   - Maxitrol luís at bedtime each eye   - Warm compresses BID each eye   - Lid scrubs daily each eye     CHELI Malin 8:05 AM 06/23/2023            Last edited by Rosa Murray on 6/23/2023  8:05 AM.          Review of systems for the eyes was negative other than the pertinent positives/negatives listed in the HPI.    Ocular Meds:   - PFAT QID OU   - Maxitrol ophthalmic ointment at bedtime OU  - Warm compresses BID OU   - Lid scrubs daily OU    Ocular Hx: mgd OU history of chalazion    FOHx: no family history of glaucoma or blindness    PMHx: No diabetes    Assessment & Plan      Adriano Lou is a 37 year old male with the following diagnoses:    1. Blepharitis of both upper and lower eyelid       - Here for follow up of blepharitis and left lower eyelid chalazion; has had several stye come and go; overall doing well with improvement and resolution of stye; compliant with treatment regiment  discussed various treatment options including trial of PO doxycycline, antibiotic ophthalmic ointment, antibiotic-steroid combo ointment  - continue preservative free artificial tears QID and PRN OU  - continue warm compresses at least BID OU for 5-10 min each time  - continue eyelid scrubs daily OU with baby shampoo or using lid scrub products such as OCuSOFT  - discontinue maxitrol ophthalmic ointment at bedtime OU; Rx for erythromycin " ophthalmic ointment at bedtime x 30 days OU provided to patient if any flare occurs; Rx sent but okay not to using at this time  - medication side effects reviewed    Counseled return/RD precautions    Patient disposition:   Return if symptoms worsen or fail to improve.       Attending Physician Attestation:  Complete documentation of historical and exam elements from today's encounter can be found in the full encounter summary report (not reduplicated in this progress note).  I personally obtained the chief complaint(s) and history of present illness.  I confirmed and edited as necessary the review of systems, past medical/surgical history, family history, social history, and examination findings as documented by others; and I examined the patient myself.  I personally reviewed the relevant tests, images, and reports as documented above.  I formulated and edited as necessary the assessment and plan and discussed the findings and management plan with the patient and family. . - Lisette Cool MD

## 2023-06-23 NOTE — NURSING NOTE
"Chief Complaints and History of Present Illnesses   Patient presents with     Follow Up     4 week follow up for Blepharitis BUL/LL, MGD each eye, and Chalazion LLL.     \"My eyes are feeling fine. The stye went away, but now there's some redness and inflammation.\" Patient states vision is good each eye. Denies changes in the last month.      Chief Complaint(s) and History of Present Illness(es)     Follow Up            Laterality: both eyes    Onset: weeks ago    Associated symptoms: redness (along LLL, some inflammation still).  Negative for eye pain and swelling    Treatments tried: artificial tears and warm compresses    Pain scale: 0/10    Comments: 4 week follow up for Blepharitis BUL/LL, MGD each eye, and Chalazion LLL.     \"My eyes are feeling fine. The stye went away, but now there's some redness and inflammation.\" Patient states vision is good each eye. Denies changes in the last month.           Comments    Ocular meds:   - PFAT QID each eye   - Maxitrol luís at bedtime each eye   - Warm compresses BID each eye   - Lid scrubs daily each eye     CHELI Malin 8:05 AM 06/23/2023                   "

## 2023-07-10 ENCOUNTER — LAB (OUTPATIENT)
Dept: LAB | Facility: CLINIC | Age: 38
End: 2023-07-10
Payer: COMMERCIAL

## 2023-07-10 DIAGNOSIS — Z30.2 ENCOUNTER FOR VASECTOMY: ICD-10-CM

## 2023-07-10 LAB
SEMEN ANALYSIS P VAS PNL: NORMAL
SPERM MOTILE SMN QL MICRO: NORMAL

## 2023-07-10 PROCEDURE — 89321 SEMEN ANAL SPERM DETECTION: CPT

## 2023-11-28 ASSESSMENT — ENCOUNTER SYMPTOMS
PALPITATIONS: 0
HEADACHES: 0
DIZZINESS: 0
HEARTBURN: 0
CONSTIPATION: 0
NAUSEA: 0
DYSURIA: 0
EYE PAIN: 0
WEAKNESS: 0
CHILLS: 0
HEMATOCHEZIA: 0
ABDOMINAL PAIN: 0
DIARRHEA: 0
PARESTHESIAS: 0
SHORTNESS OF BREATH: 0
HEMATURIA: 0
JOINT SWELLING: 0
COUGH: 0
FEVER: 0
ARTHRALGIAS: 0
MYALGIAS: 0
SORE THROAT: 0
FREQUENCY: 0
NERVOUS/ANXIOUS: 0

## 2023-12-05 ENCOUNTER — OFFICE VISIT (OUTPATIENT)
Dept: FAMILY MEDICINE | Facility: CLINIC | Age: 38
End: 2023-12-05
Payer: COMMERCIAL

## 2023-12-05 VITALS
TEMPERATURE: 98.4 F | SYSTOLIC BLOOD PRESSURE: 128 MMHG | BODY MASS INDEX: 22.2 KG/M2 | DIASTOLIC BLOOD PRESSURE: 72 MMHG | HEIGHT: 74 IN | RESPIRATION RATE: 14 BRPM | OXYGEN SATURATION: 98 % | HEART RATE: 69 BPM | WEIGHT: 173 LBS

## 2023-12-05 DIAGNOSIS — H61.21 IMPACTED CERUMEN OF RIGHT EAR: ICD-10-CM

## 2023-12-05 DIAGNOSIS — Z13.220 SCREENING CHOLESTEROL LEVEL: ICD-10-CM

## 2023-12-05 DIAGNOSIS — Z00.00 ENCOUNTER FOR ANNUAL PHYSICAL EXAM: Primary | ICD-10-CM

## 2023-12-05 DIAGNOSIS — Z83.49 FAMILY HISTORY OF THYROID DISEASE: ICD-10-CM

## 2023-12-05 DIAGNOSIS — Z13.1 SCREENING FOR DIABETES MELLITUS: ICD-10-CM

## 2023-12-05 LAB
CHOLEST SERPL-MCNC: 195 MG/DL
FASTING STATUS PATIENT QL REPORTED: YES
GLUCOSE SERPL-MCNC: 84 MG/DL (ref 70–99)
HDLC SERPL-MCNC: 69 MG/DL
LDLC SERPL CALC-MCNC: 114 MG/DL
NONHDLC SERPL-MCNC: 126 MG/DL
TRIGL SERPL-MCNC: 62 MG/DL
TSH SERPL DL<=0.005 MIU/L-ACNC: 4.18 UIU/ML (ref 0.3–4.2)

## 2023-12-05 PROCEDURE — 99395 PREV VISIT EST AGE 18-39: CPT

## 2023-12-05 PROCEDURE — 36415 COLL VENOUS BLD VENIPUNCTURE: CPT

## 2023-12-05 PROCEDURE — 82947 ASSAY GLUCOSE BLOOD QUANT: CPT

## 2023-12-05 PROCEDURE — 80061 LIPID PANEL: CPT

## 2023-12-05 PROCEDURE — 84443 ASSAY THYROID STIM HORMONE: CPT

## 2023-12-05 ASSESSMENT — ENCOUNTER SYMPTOMS
JOINT SWELLING: 0
PARESTHESIAS: 0
HEARTBURN: 0
SORE THROAT: 0
HEMATOCHEZIA: 0
DYSURIA: 0
DIARRHEA: 0
MYALGIAS: 0
PALPITATIONS: 0
COUGH: 0
FREQUENCY: 0
CONSTIPATION: 0
CHILLS: 0
WEAKNESS: 0
HEADACHES: 0
FEVER: 0
DIZZINESS: 0
EYE PAIN: 0
NAUSEA: 0
ABDOMINAL PAIN: 0
NERVOUS/ANXIOUS: 0
SHORTNESS OF BREATH: 0
HEMATURIA: 0
ARTHRALGIAS: 0

## 2023-12-05 NOTE — PROGRESS NOTES
Performed R cerumen removal per VENITA Jeffrey. No complications. Ear wax cleared successfully.    NORMAN ALLEN RN on 12/5/2023 at 9:18 AM

## 2023-12-05 NOTE — PROGRESS NOTES
"SUBJECTIVE:   Sesar is a 38 year old, presenting for the following:    Physical        12/5/2023     8:22 AM   Additional Questions   Roomed by Kera       Healthy Habits:     Getting at least 3 servings of Calcium per day:  Yes    Bi-annual eye exam:  Yes    Dental care twice a year:  Yes    Sleep apnea or symptoms of sleep apnea:  None    Diet:  Regular (no restrictions)    Frequency of exercise:  4-5 days/week    Duration of exercise:  45-60 minutes    Taking medications regularly:  Yes    Medication side effects:  None    Additional concerns today:  No    Exercise: Volleyball in [place]. Cardio-bike, weights. Has been more consistent, but not as much as he would like.  Diet: Right now, patient has been enjoying making a stew with animal meat. Pasta and rice. Shrimp pasta. Order [place]i food occasionally, and goes out to restaurants.  Mom and dad are moving up here from [place]  Family: Wife- Yulisa ([employer]- ). 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      Social History     Tobacco Use    Smoking status: Never    Smokeless tobacco: Never   Substance Use Topics    Alcohol use: Yes     Comment: Wine- less than 1 glass a few times per week. When going out friends - 2-3 drinks             11/28/2023     9:53 AM   Alcohol Use   Prescreen: >3 drinks/day or >7 drinks/week? No          No data to display                Last PSA: No results found for: \"PSA\"    Reviewed and updated as needed this visit by clinical staff   Tobacco  Allergies  Meds              Reviewed and updated as needed this visit by Provider                   Review of Systems   Constitutional:  Negative for chills and fever.   HENT:  Negative for congestion, ear pain, hearing loss and sore throat.    Eyes:  Negative for pain and visual disturbance.   Respiratory:  Negative for cough and shortness of breath.  " "  Cardiovascular:  Negative for chest pain, palpitations and peripheral edema.   Gastrointestinal:  Negative for abdominal pain, constipation, diarrhea, heartburn, hematochezia and nausea.   Genitourinary:  Negative for dysuria, frequency, genital sores, hematuria, impotence, penile discharge and urgency.   Musculoskeletal:  Negative for arthralgias, joint swelling and myalgias.   Skin:  Negative for rash.   Neurological:  Negative for dizziness, weakness, headaches and paresthesias.   Psychiatric/Behavioral:  Negative for mood changes. The patient is not nervous/anxious.        OBJECTIVE:   /72 (BP Location: Right arm, Patient Position: Sitting, Cuff Size: Adult Regular)   Pulse 69   Temp 98.4  F (36.9  C) (Temporal)   Resp 14   Ht 1.886 m (6' 2.25\")   Wt 78.5 kg (173 lb)   SpO2 98%   BMI 22.06 kg/m      Physical Exam  GENERAL: healthy, alert and no distress  EYES: Eyes grossly normal to inspection, PERRL and conjunctivae and sclerae normal  HENT: Minor cerumen present in the right canal without impaction. ear canals and TM's normal, nose and mouth without ulcers or lesions  NECK: no adenopathy, no asymmetry, masses, or scars and thyroid normal to palpation  RESP: lungs clear to auscultation - no rales, rhonchi or wheezes  CV: regular rate and rhythm, normal S1 S2, no S3 or S4, no murmur, click or rub, no peripheral edema and peripheral pulses strong  ABDOMEN: soft, nontender, no hepatosplenomegaly, no masses and bowel sounds normal  MS: no gross musculoskeletal defects noted, no edema  SKIN: Multiple nevi noted across lower back  NEURO: Normal strength and tone, mentation intact and speech normal  PSYCH: mentation appears normal, affect normal/bright    ASSESSMENT/PLAN:   (Z00.00) Encounter for annual physical exam  (primary encounter diagnosis)    (H61.21) Impacted cerumen of right ear  Plan: IA REMOVAL IMPACTED CERUMEN IRRIGATION/LVG         UNILAT (RN/MA)    (Z13.220) Screening cholesterol " level  Plan: Lipid panel reflex to direct LDL Fasting    (Z13.1) Screening for diabetes mellitus  Plan: Glucose    (Z83.49) Family history of thyroid disease  Plan: TSH with free T4 reflex      Patient has been advised of split billing requirements and indicates understanding: Yes      COUNSELING:   Reviewed preventive health counseling, as reflected in patient instructions       Regular exercise       Healthy diet/nutrition        He reports that he has never smoked. He has never used smokeless tobacco.            Ortega Jeffrey NP  [facility]

## 2023-12-13 ENCOUNTER — TELEPHONE (OUTPATIENT)
Dept: FAMILY MEDICINE | Facility: CLINIC | Age: 38
End: 2023-12-13

## 2023-12-13 ENCOUNTER — OFFICE VISIT (OUTPATIENT)
Dept: AUDIOLOGY | Facility: CLINIC | Age: 38
End: 2023-12-13
Payer: COMMERCIAL

## 2023-12-13 DIAGNOSIS — H91.90 HEARING LOSS, UNSPECIFIED HEARING LOSS TYPE, UNSPECIFIED LATERALITY: Primary | ICD-10-CM

## 2023-12-13 DIAGNOSIS — Z01.10 HEARING WITHIN NORMAL LIMITS IN BOTH EARS: Primary | ICD-10-CM

## 2023-12-13 DIAGNOSIS — Z77.122 HISTORY OF EXPOSURE TO NOISE: ICD-10-CM

## 2023-12-13 PROCEDURE — 92550 TYMPANOMETRY & REFLEX THRESH: CPT | Performed by: AUDIOLOGIST

## 2023-12-13 PROCEDURE — 92557 COMPREHENSIVE HEARING TEST: CPT | Performed by: AUDIOLOGIST

## 2023-12-13 NOTE — TELEPHONE ENCOUNTER
Patient calling to request referral to audiology     Pt has difficulty hearing and would like to figure out if it is physical hearing issue or attention contributing to lack of hearing.     Order pended     Mahi HARRISON RN  [facility]

## 2023-12-13 NOTE — PROGRESS NOTES
AUDIOLOGY REPORT    SUBJECTIVE:  Adriano Lou is a 38 year old male who was seen in the Audiology Clinic at the [facility] for audiologic evaluation, referred by Ortega Jeffrey N.P. The patient reports  mild concerns with hearing difficulty- he is mainly noticing trouble in background noise or if people do not get his attention before talking. Reports some history of noise exposure playing in bands as a teenager without hearing protection. Family history of hearing loss in his grandfather (unsure of age of onset). Denies ear pain, pressure, drainage, tinnitus, dizziness, or history of ear surgeries.     OBJECTIVE:  Abuse Screening:  Do you feel unsafe at home or work/school? No  Do you feel threatened by someone? No  Does anyone try to keep you from having contact with others, or doing things outside of your home? No  Physical signs of abuse present? No     Fall Risk Screen:  1. Have you fallen two or more times in the past year? No  2. Have you fallen and had an injury in the past year? No      Otoscopic exam indicates ears are clear of cerumen bilaterally     Pure Tone Thresholds assessed using conventional audiometry with good  reliability from 250-8000 Hz bilaterally using insert earphones and circumaural headphones     RIGHT:   Normal hearing sensitivity      LEFT:    Normal hearing sensitivity    Tympanogram:    RIGHT: normal eardrum mobility    LEFT:   normal eardrum mobility    Reflexes (reported by stimulus ear):  RIGHT: Ipsilateral is present at elevated levels  RIGHT: Contralateral is absent at frequencies tested  LEFT:   Ipsilateral is absent at frequencies tested  LEFT:   Contralateral is absent at frequencies tested      Speech Reception Threshold:    RIGHT: 15 dB HL    LEFT:   15 dB HL  Word Recognition Score:     RIGHT: 100% at 55 dB HL using NU-6 recorded word list.    LEFT:   100% at 55 dB HL using NU-6 recorded word list.      ASSESSMENT:    Normal hearing sensitivity bilaterally. Today s results were discussed with the patient in detail.     PLAN:  Patient was counseled regarding hearing conservation, hearing loss and impact on communication.  Handout on good communication strategies was given to patient. It is recommended that the patient return to monitor hearing if changes are noted or new ear symptoms arise.  Please call this clinic with questions regarding these results or recommendations.        Ivana Whatley.  Licensed Audiologist  [place] # [id number]

## 2024-04-05 ENCOUNTER — OFFICE VISIT (OUTPATIENT)
Dept: DERMATOLOGY | Facility: CLINIC | Age: 39
End: 2024-04-05
Payer: COMMERCIAL

## 2024-04-05 DIAGNOSIS — L82.1 SEBORRHEIC KERATOSIS: ICD-10-CM

## 2024-04-05 DIAGNOSIS — D22.9 MULTIPLE BENIGN NEVI: Primary | ICD-10-CM

## 2024-04-05 DIAGNOSIS — L81.4 LENTIGINES: ICD-10-CM

## 2024-04-05 DIAGNOSIS — D18.01 CHERRY ANGIOMA: ICD-10-CM

## 2024-04-05 DIAGNOSIS — D22.39 FIBROUS PAPULE OF NOSE: ICD-10-CM

## 2024-04-05 DIAGNOSIS — L73.8 SENILE SEBACEOUS GLAND HYPERPLASIA: ICD-10-CM

## 2024-04-05 PROCEDURE — 99213 OFFICE O/P EST LOW 20 MIN: CPT | Performed by: DERMATOLOGY

## 2024-04-05 ASSESSMENT — PAIN SCALES - GENERAL: PAINLEVEL: NO PAIN (0)

## 2024-04-05 NOTE — PROGRESS NOTES
[facility]atology Note  Encounter Date: Apr 5, 2024  Office Visit     Dermatology Problem List:  Last skin check 04/05/24   1. Multiple benign nevi  - 4mm left upper mid back, photos 4/12/2023  - 5mm right posterior thigh, photos 4/12/2023   2. Sebaceous hyperplasia, face  3. Fibrous papule, nose    # Intermittent tanning bed use for ~1 year, <100 times.    ____________________________________________    Assessment & Plan:    # Sebaceous hyperplasia, face  - Explained to patient benign nature.    # Fibrous papule, nose.  - Explained to patient benign nature.  - Advised patient that if lesion changes or becomes painful, follow up to recheck.    # Benign lesions - SKs, cherry angiomas, lentigenes.  - No treatment required    # Multiple benign nevi.   - Overall reassuring exam today; nevi that we are monitoring appear unchanged  - Monitor for ABCDEs of melanoma   - Continue sun protection - recommend SPF 30 or higher with frequent application   - Return sooner if noticing changing or symptomatic lesions    Procedures Performed:   None.    Follow-up: 1 year(s) in-person, or earlier for new or changing lesions    Staff and Scribe:     Scribe Disclosure:   I, NAOMY HUDSON, am serving as a scribe; to document services personally performed by Emily Eddy MD -based on data collection and the provider's statements to me.    Provider Disclosure:   The documentation recorded by the scribe accurately reflects the services I personally performed and the decisions made by me.    Emily Eddy MD    Department of Dermatology  [facility]: Phone: [phone], Fax: [phone]  4/9/2024     ____________________________________________    CC: Skin Check (Patient reports no new lesions of concern. The patient would like a FBSE. )    HPI:  Mr. Adriano Lou is a(n) 38 year old male who presents  today as a return patient for FBSE.    The patient has 2 spots on his face. 1 he feels like a pimple, the other spot he is unsure.     Patient is otherwise feeling well, without additional skin concerns.    Labs Reviewed:  N/A    Physical Exam:  Vitals: There were no vitals taken for this visit.  SKIN: Total skin excluding the undergarment areas was performed. The exam included the head/face, neck, both arms, chest, back, abdomen, both legs, digits and/or nails.   - 4mm left upper mid back hyperpigmented macule with symmetric reticular pattern. Unchanged from prior.    - 5mm posterior right thigh hyperpigmented macule with unform reticular network but left upper pole with slightly darker pigmented noted on dermoscopy. Unchanged from prior.  - There are yellow oily papules with central umbilication located on the face.  - There are dome shaped bright red papules on the trunk and extremities .   - Multiple regular brown pigmented macules and papules are identified on the trunk and extremities.   - Scattered brown macules on sun exposed areas.  - Waxy stuck on papules and plaques on trunk and extremities.   - No other lesions of concern on areas examined.     Medications:  No current outpatient medications on file.     No current facility-administered medications for this visit.      Past Medical History:   There is no problem list on file for this patient.    No past medical history on file.     CC Referred Self, MD  No address on file on close of this encounter.

## 2024-04-05 NOTE — PATIENT INSTRUCTIONS

## 2024-04-05 NOTE — LETTER
4/5/2024       RE: Adriano Lou  [address]  [facility] [address]     Dear Colleague,    Thank you for referring your patient, Adriano Lou, to the [facility] DERMATOLOGY CLINIC [place] at [facility]. Please see a copy of my visit note below.    [facility] Dermatology Note  Encounter Date: Apr 5, 2024  Office Visit     Dermatology Problem List:  Last skin check 04/05/24   1. Multiple benign nevi  - 4mm left upper mid back, photos 4/12/2023  - 5mm right posterior thigh, photos 4/12/2023   2. Sebaceous hyperplasia, face  3. Fibrous papule, nose    # Intermittent tanning bed use for ~1 year, <100 times.    ____________________________________________    Assessment & Plan:    # Sebaceous hyperplasia, face  - Explained to patient benign nature.    # Fibrous papule, nose.  - Explained to patient benign nature.  - Advised patient that if lesion changes or becomes painful, follow up to recheck.    # Benign lesions - SKs, cherry angiomas, lentigenes.  - No treatment required    # Multiple benign nevi.   - Overall reassuring exam today; nevi that we are monitoring appear unchanged  - Monitor for ABCDEs of melanoma   - Continue sun protection - recommend SPF 30 or higher with frequent application   - Return sooner if noticing changing or symptomatic lesions    Procedures Performed:   None.    Follow-up: 1 year(s) in-person, or earlier for new or changing lesions    Staff and Scribe:     Scribe Disclosure:   I, NAOMY HUDSON, am serving as a scribe; to document services personally performed by Emily Eddy MD -based on data collection and the provider's statements to me.    Provider Disclosure:   The documentation recorded by the scribe accurately reflects the services I personally performed and the decisions made by me.    Emily Eddy MD    Department of Dermatology  [facility]  [facility]: Phone: [phone], Fax: [phone]  4/9/2024     ____________________________________________    CC: Skin Check (Patient reports no new lesions of concern. The patient would like a FBSE. )    HPI:  Mr. Adriano Lou is a(n) 38 year old male who presents today as a return patient for FBSE.    The patient has 2 spots on his face. 1 he feels like a pimple, the other spot he is unsure.     Patient is otherwise feeling well, without additional skin concerns.    Labs Reviewed:  N/A    Physical Exam:  Vitals: There were no vitals taken for this visit.  SKIN: Total skin excluding the undergarment areas was performed. The exam included the head/face, neck, both arms, chest, back, abdomen, both legs, digits and/or nails.   - 4mm left upper mid back hyperpigmented macule with symmetric reticular pattern. Unchanged from prior.    - 5mm posterior right thigh hyperpigmented macule with unform reticular network but left upper pole with slightly darker pigmented noted on dermoscopy. Unchanged from prior.  - There are yellow oily papules with central umbilication located on the face.  - There are dome shaped bright red papules on the trunk and extremities .   - Multiple regular brown pigmented macules and papules are identified on the trunk and extremities.   - Scattered brown macules on sun exposed areas.  - Waxy stuck on papules and plaques on trunk and extremities.   - No other lesions of concern on areas examined.     Medications:  No current outpatient medications on file.     No current facility-administered medications for this visit.      Past Medical History:   There is no problem list on file for this patient.    No past medical history on file.     CC Referred Self,

## 2024-04-05 NOTE — NURSING NOTE
Chief Complaint   Patient presents with    Skin Check     Patient reports no new lesions of concern. The patient would like a FBSE.      Myrna Stephenson LPN

## 2024-07-19 ENCOUNTER — E-VISIT (OUTPATIENT)
Dept: FAMILY MEDICINE | Facility: CLINIC | Age: 39
End: 2024-07-19
Payer: COMMERCIAL

## 2024-07-19 DIAGNOSIS — M54.6 ACUTE MIDLINE THORACIC BACK PAIN: Primary | ICD-10-CM

## 2024-07-19 PROCEDURE — 99421 OL DIG E/M SVC 5-10 MIN: CPT

## 2024-07-19 RX ORDER — CYCLOBENZAPRINE HCL 5 MG
5 TABLET ORAL 3 TIMES DAILY PRN
Qty: 30 TABLET | Refills: 1 | Status: SHIPPED | OUTPATIENT
Start: 2024-07-19

## 2024-11-05 ENCOUNTER — PATIENT OUTREACH (OUTPATIENT)
Dept: CARE COORDINATION | Facility: CLINIC | Age: 39
End: 2024-11-05
Payer: COMMERCIAL

## 2024-11-19 ENCOUNTER — PATIENT OUTREACH (OUTPATIENT)
Dept: CARE COORDINATION | Facility: CLINIC | Age: 39
End: 2024-11-19
Payer: COMMERCIAL

## 2025-01-19 ENCOUNTER — HEALTH MAINTENANCE LETTER (OUTPATIENT)
Age: 40
End: 2025-01-19

## 2025-04-09 NOTE — PROGRESS NOTES
[facility] Dermatology Note  Encounter Date: Apr 11, 2025  Office Visit     Dermatology Problem List:  Last skin check 04/11/2025     # NUB, L paraspinal mid back  - s/p shave bx 04/11/2025    1. Multiple benign nevi  - 4mm left upper mid back, photos 4/12/2023  - 5mm right posterior thigh, photos 4/12/2023   2. Sebaceous hyperplasia, face  3. Fibrous papule, nose    # Intermittent tanning bed use for ~1 year, <100 times.  ____________________________________________    Assessment & Plan:    # NUB, L paraspinal mid back  - Shave biopsy performed today. See procedure note below.     # Sebaceous hyperplasia, face.   - discussed benign nature. No treatment necessary.     # Benign lesions - SKs, cherry angiomas, lentigenes.  - No treatment required    # Multiple benign nevi   --> 4mm left upper mid back, photos 4/12/2023. Unchanged from prior.  ---> 5mm right posterior thigh, photos 4/12/2023. Unchanged from prior.  - Monitor for ABCDEs of melanoma   - Continue sun protection - recommend SPF 30 or higher with frequent application   - Return sooner if noticing changing or symptomatic lesions     Procedures Performed:   - Shave biopsy: After discussion of benefits and risks including but not limited to bleeding, infection, scar, incomplete removal, recurrence, and non-diagnostic biopsy, written consent and photographs were obtained. The area was cleaned with isopropyl alcohol. < 1mL of 1% lidocaine with epinephrine was injected to obtain adequate anesthesia. A shave biopsy was performed. Hemostasis was achieved with aluminium chloride. Vaseline and a sterile dressing were applied. The patient tolerated the procedure and no complications were noted. The patient was provided with verbal and written post care instructions.     Follow-up: 1 year(s) in-person, or earlier for new or changing lesions    Staff and Scribe:     Scribe Disclosure:   Amee HORNER, am serving as a scribe; to document services  personally performed by Emily Eddy MD -based on data collection and the provider's statements to me.     Provider Disclosure:   The documentation recorded by the scribe accurately reflects the services I personally performed and the decisions made by me.    Emily Eddy MD    Department of Dermatology  [facility]: Phone: [phone], Fax: [phone]  4/16/2025         ____________________________________________    CC: Skin Check (Here today for a skin check. No concerns. )    HPI:  Mr. Adriano Lou is a(n) 39 year old male who presents today as a return patient for FBSE.    Thinks he may have had a wart last May. Saw an outside derm for this where cryo was performed on the area, notes there is a slight scar now. It hasn't come back.  Has some spots on forehead, has not changed recently. Presented within the last year.   Otherwise, no additional skin concerns today.   Denies fhx of melanoma.     Patient is otherwise feeling well, without additional skin concerns.    Labs Reviewed:  N/A    Physical exam:  Vitals: There were no vitals taken for this visit.  GEN: This is a well developed, well-nourished male in no acute distress, in a pleasant mood.    SKIN: Total skin excluding the undergarment areas was performed. The exam included the head/face, neck, both arms, chest, back, abdomen, both legs, digits and/or nails.   - 4mm left upper mid back hyperpigmented macule with symmetric reticular pattern. Unchanged from prior.    - 5mm posterior right thigh hyperpigmented macule with unform reticular network but left upper pole with slightly darker pigmented noted on dermoscopy. Unchanged from prior.   - L paraspinal mid back, slightly irregular brown macule  - There are dome shaped bright red papules on the trunk and extremities .   - Multiple regular brown pigmented macules and papules are identified on  the trunk and extremities.   - Scattered brown macules on sun exposed areas.  - Waxy stuck on papules and plaques on trunk and extremities.   - No other lesions of concern on areas examined.     Medications:  Current Outpatient Medications   Medication Sig Dispense Refill    cyclobenzaprine (FLEXERIL) 5 MG tablet Take 1 tablet (5 mg) by mouth 3 times daily as needed for muscle spasms (Patient not taking: Reported on 4/11/2025) 30 tablet 1     No current facility-administered medications for this visit.      Past Medical History:   There is no problem list on file for this patient.    No past medical history on file.     CC Referred Self, MD  No address on file on close of this encounter.

## 2025-04-11 ENCOUNTER — OFFICE VISIT (OUTPATIENT)
Dept: DERMATOLOGY | Facility: CLINIC | Age: 40
End: 2025-04-11
Attending: DERMATOLOGY
Payer: COMMERCIAL

## 2025-04-11 DIAGNOSIS — L82.1 SEBORRHEIC KERATOSES: ICD-10-CM

## 2025-04-11 DIAGNOSIS — L81.4 SOLAR LENTIGO: ICD-10-CM

## 2025-04-11 DIAGNOSIS — Z12.83 SKIN CANCER SCREENING: Primary | ICD-10-CM

## 2025-04-11 DIAGNOSIS — D49.2 NEOPLASM OF UNSPECIFIED BEHAVIOR OF BONE, SOFT TISSUE, AND SKIN: ICD-10-CM

## 2025-04-11 DIAGNOSIS — D22.9 MULTIPLE BENIGN NEVI: ICD-10-CM

## 2025-04-11 DIAGNOSIS — D18.01 CHERRY ANGIOMA: ICD-10-CM

## 2025-04-11 DIAGNOSIS — L73.8 SENILE SEBACEOUS GLAND HYPERPLASIA: ICD-10-CM

## 2025-04-11 PROCEDURE — 88341 IMHCHEM/IMCYTCHM EA ADD ANTB: CPT | Mod: TC | Performed by: DERMATOLOGY

## 2025-04-11 PROCEDURE — 88342 IMHCHEM/IMCYTCHM 1ST ANTB: CPT | Mod: 26 | Performed by: STUDENT IN AN ORGANIZED HEALTH CARE EDUCATION/TRAINING PROGRAM

## 2025-04-11 PROCEDURE — 88305 TISSUE EXAM BY PATHOLOGIST: CPT | Mod: 26 | Performed by: STUDENT IN AN ORGANIZED HEALTH CARE EDUCATION/TRAINING PROGRAM

## 2025-04-11 PROCEDURE — 88341 IMHCHEM/IMCYTCHM EA ADD ANTB: CPT | Mod: 26 | Performed by: STUDENT IN AN ORGANIZED HEALTH CARE EDUCATION/TRAINING PROGRAM

## 2025-04-11 ASSESSMENT — PAIN SCALES - GENERAL: PAINLEVEL_OUTOF10: NO PAIN (0)

## 2025-04-11 NOTE — NURSING NOTE
Dermatology Rooming Note    Adriano Lou's goals for this visit include:   Chief Complaint   Patient presents with    Skin Check     Here today for a skin check. No concerns.      Trista Wong RN

## 2025-04-11 NOTE — LETTER
4/11/2025       RE: Adriano Lou  [address]  [facility] [address]     Dear Colleague,    Thank you for referring your patient, Adriano Lou, to the [facility] DERMATOLOGY CLINIC [place] at [facility]. Please see a copy of my visit note below.    [facility] Dermatology Note  Encounter Date: Apr 11, 2025  Office Visit     Dermatology Problem List:  Last skin check 04/11/2025     # DEBI, L paraspinal mid back  - s/p shave bx 04/11/2025    1. Multiple benign nevi  - 4mm left upper mid back, photos 4/12/2023  - 5mm right posterior thigh, photos 4/12/2023   2. Sebaceous hyperplasia, face  3. Fibrous papule, nose    # Intermittent tanning bed use for ~1 year, <100 times.  ____________________________________________    Assessment & Plan:    # NUB, L paraspinal mid back  - Shave biopsy performed today. See procedure note below.     # Sebaceous hyperplasia, face.   - discussed benign nature. No treatment necessary.     # Benign lesions - SKs, cherry angiomas, lentigenes.  - No treatment required    # Multiple benign nevi   --> 4mm left upper mid back, photos 4/12/2023. Unchanged from prior.  ---> 5mm right posterior thigh, photos 4/12/2023. Unchanged from prior.  - Monitor for ABCDEs of melanoma   - Continue sun protection - recommend SPF 30 or higher with frequent application   - Return sooner if noticing changing or symptomatic lesions     Procedures Performed:   - Shave biopsy: After discussion of benefits and risks including but not limited to bleeding, infection, scar, incomplete removal, recurrence, and non-diagnostic biopsy, written consent and photographs were obtained. The area was cleaned with isopropyl alcohol. < 1mL of 1% lidocaine with epinephrine was injected to obtain adequate anesthesia. A shave biopsy was performed. Hemostasis was achieved with aluminium chloride. Vaseline and a sterile dressing were applied.  The patient tolerated the procedure and no complications were noted. The patient was provided with verbal and written post care instructions.     Follow-up: 1 year(s) in-person, or earlier for new or changing lesions    Staff and Scribe:     Scribe Disclosure:   I, Amee Rai, am serving as a scribe; to document services personally performed by Emily Eddy MD -based on data collection and the provider's statements to me.     Provider Disclosure:   The documentation recorded by the scribe accurately reflects the services I personally performed and the decisions made by me.    Emily Eddy MD    Department of Dermatology  [facility]: Phone: [phone], Fax: [phone]  4/16/2025         ____________________________________________    CC: Skin Check (Here today for a skin check. No concerns. )    HPI:  Mr. Adriano Lou is a(n) 39 year old male who presents today as a return patient for FBSE.    Thinks he may have had a wart last May. Saw an outside derm for this where cryo was performed on the area, notes there is a slight scar now. It hasn't come back.  Has some spots on forehead, has not changed recently. Presented within the last year.   Otherwise, no additional skin concerns today.   Denies fhx of melanoma.     Patient is otherwise feeling well, without additional skin concerns.    Labs Reviewed:  N/A    Physical exam:  Vitals: There were no vitals taken for this visit.  GEN: This is a well developed, well-nourished male in no acute distress, in a pleasant mood.    SKIN: Total skin excluding the undergarment areas was performed. The exam included the head/face, neck, both arms, chest, back, abdomen, both legs, digits and/or nails.   - 4mm left upper mid back hyperpigmented macule with symmetric reticular pattern. Unchanged from prior.    - 5mm posterior right thigh hyperpigmented macule with  unform reticular network but left upper pole with slightly darker pigmented noted on dermoscopy. Unchanged from prior.   - L paraspinal mid back, slightly irregular brown macule  - There are dome shaped bright red papules on the trunk and extremities .   - Multiple regular brown pigmented macules and papules are identified on the trunk and extremities.   - Scattered brown macules on sun exposed areas.  - Waxy stuck on papules and plaques on trunk and extremities.   - No other lesions of concern on areas examined.     Medications:  Current Outpatient Medications   Medication Sig Dispense Refill     cyclobenzaprine (FLEXERIL) 5 MG tablet Take 1 tablet (5 mg) by mouth 3 times daily as needed for muscle spasms (Patient not taking: Reported on 4/11/2025) 30 tablet 1     No current facility-administered medications for this visit.      Past Medical History:   There is no problem list on file for this patient.    No past medical history on file.     CC Harman Lopez MD  No address on file on close of this encounter.      Again, thank you for allowing me to participate in the care of your patient.      Sincerely,    Emily Eddy MD

## 2025-04-11 NOTE — NURSING NOTE
Lidocaine-epinephrine 1-1:752149 % injection   0.25mL once for one use, starting 4/11/2025 ending 4/11/2025,  2mL disp, R-0, injection  Injected by Trista Wong RN

## 2025-04-11 NOTE — PATIENT INSTRUCTIONS
Checking for Skin Cancer  You can help find cancer early by checking your skin each month. There are 3 main kinds of skin cancer: melanoma, basal cell carcinoma, and squamous cell carcinoma. Doing monthly skin checks is the best way to find new marks, sores, or skin changes. Follow these instructions for checking your skin.   The ABCDEs of checking moles for melanoma   Check your moles or growths for signs of melanoma using ABCDE:   Asymmetry: The sides of the mole or growth don t match.  Border: The edges are ragged, notched, or blurred.  Color: The color within the mole or growth varies. It could be black, brown, tan, white, or shades of red, gray, or blue.  Diameter: The mole or growth is larger than   inch or 6 mm (size of a pencil eraser).  Evolving: The size, shape, texture, or color of the mole or growth is changing.     ABCDE's of moles on light skin.        ABCDE's of moles on dark skin may be harder to identify.     Checking for other types of skin cancer  Basal cell carcinoma or squamous cell carcinoma cause symptoms like:     A spot or mole that looks different from all other marks on your skin  Changes in how an area feels, such as itching, tenderness, or pain  Changes in the skin's surface, such as oozing, bleeding, or scaliness  A sore that doesn't heal  New swelling, redness, or spread of color beyond the border of a mole    Who s at risk?  Anyone of any skin color can get skin cancer. But you're at greater risk if you have:   Fair skin that freckles easily and burns instead of tanning  Light-colored or red hair  Light-colored eyes  Many moles or abnormal moles on your skin  A long history of unprotected exposure to sunlight or tanning beds  A history of many blistering sunburns as a child or teen  A family history of skin cancer  Been exposed to radiation or chemicals  A weakened immune system  Been exposed to arsenic  If you've had skin cancer in the past, you're at high risk of having it again.    How to check your skin  Do your monthly skin checkups in front of a full-length mirror. Use a room with good lighting so it's easier to see. Use a hand mirror to look at hard-to-see places like your buttocks and back. You can also have a trusted friend or family member help you with these checks. Check every part of your body, including your:   Head (ears, face, neck, and scalp)  Torso (front, back, sides, and under breasts)  Arms (tops, undersides, and armpits)  Hands (palms, backs, and fingers, including under the nails)  Lower back, buttocks, and genitals  Legs (front, back, and sides)  Feet (tops, soles, toes, including under the nails, and between toes)  Watch for new spots on your skin or a spot that's changing in color, shape, size.   If you have a lot of moles, take digital photos of them each month. Make sure to take photos both up close and from a distance. These can help you see if any moles change over time.   Know your skin  Most skin changes aren't cancer. But if you see any changes in your skin, call your healthcare provider right away. Only they can tell you if a change is a problem. If you have skin cancer, seeing your provider can be the first step to getting the treatment that could save your life.   Jessa last reviewed this educational content on 10/1/2021    2641-3488 [employer]. All rights reserved. This information is not intended as a substitute for professional medical care. Always follow your healthcare professional's instructions.     Wound Care After a Biopsy    What is a skin biopsy?  A skin biopsy allows the doctor to examine a very small piece of tissue under the microscope to determine the diagnosis and the best treatment for the skin condition. A local anesthetic (numbing medicine) is injected with a very small needle into the skin area to be tested. A small piece of skin is taken from the area. Sometimes a suture (stitch) is used.     What are the risks of a skin  biopsy?  I will experience scar, bleeding, swelling, pain, crusting and redness. I may experience incomplete removal or recurrence. Risks of this procedure are excessive bleeding, bruising, infection, nerve damage, numbness, thick (hypertrophic or keloidal) scar and non-diagnostic biopsy.    How should I care for my wound for the first 24 hours?  Keep the wound dry and covered for 24 hours  If it bleeds, hold direct pressure on the area for 15 minutes. If bleeding does not stop, call us or go to the emergency room  Avoid strenuous exercise the first 1-2 days or as your doctor instructs you    How should I care for the wound after 24 hours?  After 24 hours, remove the bandage  You may bathe or shower as normal  If you had a scalp biopsy, you can shampoo as usual and can use shower water to clean the biopsy site daily  Clean the wound once a day with gentle soap and water  Do not scrub, be gentle  Apply white petroleum/Vaseline after cleaning the wound with a cotton swab or a clean finger, and keep the site covered with a Bandaid /bandage. Bandages are not necessary with a scalp biopsy  If you are unable to cover the site with a Bandaid /bandage, re-apply ointment 2-3 times a day to keep the site moist. Moisture will help with healing  Avoid strenuous activity for first 1-2 days  Avoid lakes, rivers, pools, and oceans until the stitches are removed or the site is healed    How do I clean my wound?  Wash hands thoroughly with soap or use hand  before all wound care  Clean the wound with gentle soap and water  Apply white petroleum/Vaseline  to wound after it is clean  Replace the Bandaid /bandage to keep the wound covered for the first few days or as instructed by your doctor  If you had a scalp biopsy, warm shower water to the area on a daily basis should suffice    What should I use to clean my wound?   Cotton-tipped applicators (Qtips )  White petroleum jelly (Vaseline ). Use a clean new container and use  Q-tips to apply.  Bandaids  as needed  Gentle soap     How should I care for my wound long term?  Do not get your wound dirty  Keep up with wound care for one week or until the area is healed.  A small scab will form and fall off by itself when the area is completely healed. The area will be red and will become pink in color as it heals. Sun protection is very important for how your scar will turn out. Sunscreen with an SPF 30 or greater is recommended once the area is healed.  You should have some soreness but it should be mild and slowly go away over several days. Talk to your doctor about using tylenol for pain,    When should I call my doctor?  If you have increased:   Pain or swelling  Pus or drainage (clear or slightly yellow drainage is ok)  Temperature over 100F  Spreading redness or warmth around wound    When will I hear about my results?  The biopsy results can take 2 weeks to come back.  Your results will automatically release to [employer] before your provider has even reviewed them.  The clinic will call you with the results, send you a [employer] message, or have you schedule a follow-up clinic or phone time to discuss the results.  Contact our clinics if you do not hear from us in 2 weeks.    Who should I call with questions?  [facility]: [phone]  [facility]: [phone]  For urgent needs outside of business hours call the [facility] at [phone] and ask for the dermatology resident on call

## 2025-04-16 LAB
PATH REPORT.COMMENTS IMP SPEC: NORMAL
PATH REPORT.FINAL DX SPEC: NORMAL
PATH REPORT.GROSS SPEC: NORMAL
PATH REPORT.MICROSCOPIC SPEC OTHER STN: NORMAL
PATH REPORT.RELEVANT HX SPEC: NORMAL

## 2025-06-03 ENCOUNTER — PATIENT OUTREACH (OUTPATIENT)
Dept: CARE COORDINATION | Facility: CLINIC | Age: 40
End: 2025-06-03
Payer: COMMERCIAL

## 2025-06-10 ENCOUNTER — PATIENT OUTREACH (OUTPATIENT)
Dept: CARE COORDINATION | Facility: CLINIC | Age: 40
End: 2025-06-10
Payer: COMMERCIAL